# Patient Record
Sex: FEMALE | Race: WHITE | NOT HISPANIC OR LATINO | Employment: FULL TIME | URBAN - METROPOLITAN AREA
[De-identification: names, ages, dates, MRNs, and addresses within clinical notes are randomized per-mention and may not be internally consistent; named-entity substitution may affect disease eponyms.]

---

## 2023-05-14 ENCOUNTER — HOSPITAL ENCOUNTER (OUTPATIENT)
Facility: CLINIC | Age: 33
Discharge: HOME | End: 2023-05-14
Attending: OBSTETRICS & GYNECOLOGY | Admitting: OBSTETRICS & GYNECOLOGY
Payer: COMMERCIAL

## 2023-05-14 ENCOUNTER — NURSE TRIAGE (OUTPATIENT)
Dept: NURSING | Facility: CLINIC | Age: 33
End: 2023-05-14

## 2023-05-14 ENCOUNTER — HOSPITAL ENCOUNTER (OUTPATIENT)
Facility: CLINIC | Age: 33
End: 2023-05-14
Admitting: OBSTETRICS & GYNECOLOGY
Payer: COMMERCIAL

## 2023-05-14 VITALS — SYSTOLIC BLOOD PRESSURE: 120 MMHG | TEMPERATURE: 98.3 F | DIASTOLIC BLOOD PRESSURE: 78 MMHG

## 2023-05-14 PROBLEM — Z36.89 ENCOUNTER FOR TRIAGE IN PREGNANT PATIENT: Status: ACTIVE | Noted: 2023-05-14

## 2023-05-14 LAB
CRYSTALS AMN MICRO: NORMAL
RUPTURE OF FETAL MEMBRANES BY ROM PLUS: NEGATIVE

## 2023-05-14 PROCEDURE — 84112 EVAL AMNIOTIC FLUID PROTEIN: CPT | Performed by: OBSTETRICS & GYNECOLOGY

## 2023-05-14 PROCEDURE — 59025 FETAL NON-STRESS TEST: CPT | Mod: 26 | Performed by: OBSTETRICS & GYNECOLOGY

## 2023-05-14 PROCEDURE — G0463 HOSPITAL OUTPT CLINIC VISIT: HCPCS | Mod: 25

## 2023-05-14 PROCEDURE — 59025 FETAL NON-STRESS TEST: CPT

## 2023-05-14 PROCEDURE — 99203 OFFICE O/P NEW LOW 30 MIN: CPT | Mod: 25 | Performed by: OBSTETRICS & GYNECOLOGY

## 2023-05-14 RX ORDER — ASPIRIN 81 MG/1
81 TABLET, CHEWABLE ORAL DAILY
COMMUNITY

## 2023-05-14 RX ORDER — CALCIUM CARBONATE 500 MG/1
2 TABLET, CHEWABLE ORAL 3 TIMES DAILY
COMMUNITY

## 2023-05-14 RX ORDER — ASPIRIN 325 MG
2 TABLET ORAL DAILY
COMMUNITY

## 2023-05-14 RX ORDER — FAMOTIDINE 20 MG/1
20 TABLET, FILM COATED ORAL 2 TIMES DAILY PRN
COMMUNITY

## 2023-05-14 RX ORDER — LIDOCAINE 40 MG/G
CREAM TOPICAL
Status: DISCONTINUED | OUTPATIENT
Start: 2023-05-14 | End: 2023-05-14 | Stop reason: HOSPADM

## 2023-05-14 ASSESSMENT — ACTIVITIES OF DAILY LIVING (ADL)
ADLS_ACUITY_SCORE: 35
ADLS_ACUITY_SCORE: 31

## 2023-05-14 NOTE — PROGRESS NOTES
Pt presents to L&D for evaluation of SROM 5-13-23 @ 1030.  She states it was clear and mucousy.  She has been wearing a pantyliner since then with the same type of discharge. No pops or gushes of fluid.  Pt. Did have intercourse  Yesterday. Denies sandra vaginal bleeding or abd pain. She states she feels abd tightening which is not painful. Denies Pre-E symptoms.

## 2023-05-14 NOTE — TELEPHONE ENCOUNTER
"OB Triage Call    Is patient's OB/Midwife with the formerly [facility]? Non- [facility] OB provider     [facility] in [place].      Reason for call: Yesterday morning pt was having sex with spouse and  before pt orgasmed she had a few instances of significant watery discharge  (but had not orgasmed herself when this happened). States this is a lot more than normal and her  even asked if her water had broke.   Pt states over the past day, she's continued to have more leakage than normal - both watery and mucousy - had membranes swept 3 days ago.       Assessment: A couple instances with low back pain - and a few times of a \"really bad period cramps\" -   Had membranes swept this past Thursday. States baby is moving as per normal - States at least 5x/hour.  States her uterus is getting hard at times as well.      * DUE DATE:  5/18/2023    As [place] is on divery -   Plan: [facility]  Writer called and spoke with Charge Nurse Ashlee who advised it was ok for pt to come in.     Patient plans to deliver at [place]    Patient's primary OB Provider is Dr Layne Wallace.    Per protocol recommendations Patient to be evaluated in L&D.  Patient plans to deliver at Delivering [facility]: [facility] ([phone]).  Labor and Deliver notified of patient's pending arrival.  Report given to Usha.    Patient encouraged to consult primary OB provider office (not affiliated with [facility]) with additional questions or concerns and if planning to present to hospital for evaluation.       Is patient's delivering hospital on divert? Yes- If patient's hospital of choice is on divert, explain to patient their current hospital of choice is not accepting patients. Review other [facility] locations with patient. Patient's second hospital of choice Delivering Hospital: [facility] ([phone]). " " L&D notified of patient's pending arrival. Report given to Ashlee.  Route encounter to primary OB provider as FYI.       Unknown    Estimated Date of Delivery: 5/18/2023    No obstetric history on file.    OB History   No obstetric history on file.       No results found for: JAIMEE Antonio RN 05/14/23 7:27 AM  [facility] Nurse Advisor    Reason for Disposition    Leakage of fluid from vagina    Additional Information    Negative: [1] SEVERE abdominal pain (e.g., excruciating) AND [2] constant    Negative: Severe bleeding (e.g., continuous red blood from vagina, or large blood clots)    Negative: Umbilical cord hanging out of the vagina (shiny, white, curled appearance, \"like telephone cord\")    Negative: Uncontrollable urge to push (i.e., feels like baby is coming out now)    Negative: Can see baby    Negative: Sounds like a life-threatening emergency to the triager    Negative: < 20 weeks pregnant    Negative: Vaginal bleeding    Protocols used: PREGNANCY - RUPTURE OF MEMBRANES [id number]        "

## 2023-05-14 NOTE — PROGRESS NOTES
[facility]  OB Triage Visit      Jyothi Canchola MRN# [id number]   Age: 32 year old YOB: 1990     CC:  Leaking fluid    HPI:  Jyothi Canchola is a 32 year old  at 39w3d who presents with leaking fluid. She had intercourse yesterday and noted increased discharge for the rest of the day and this morning since then. Has had intermittent contractions, but denies contractions currently. Denies vaginal bleeding. Normal fetal movement.     PE:  Vit:   Patient Vitals for the past 4 hrs:   BP Temp Temp src   23 0900 120/78 98.3  F (36.8  C) Oral      Gen: Well-appearing, NAD, comfortable  CV: Well perfused, regular rate  Pulm: Breathing comfortably  Abd: Soft, gravid, non-tender  Ext: trace LE edema b/l  Cx: /-3    FHT: Baseline 125, mod variability, accelerations present, no decelerations, NST is reactive   Clutier: quiet      Assessment/Plan:  Jyothi Canchola is a 32 year old  39w3d who presents for rule out rupture of membranes.    Rule out ROM  - SSE without any pooling, or LOF with valsalva. ROM+ negative and ferning negative. No concern for ROM at this time. Cervix /-3, unchanged from exam a few days ago in clinic. No concern for labor. Stable for discharge.    The patient was discussed with Dr. Mayer who is in agreement with the treatment plan.    Cheyenne Hansen MD  Obstetrics & Gynecology, PGY-3  2023 10:50 AM       Physician Attestation   I, Miranda Mayer, saw and examined this patient separately from the resident and agree with the resident's findings and plan of care as documented in the note.      I personally reviewed vital signs, labs and fetal heart tones and toco.    Key findings: patient here at 39w3d for R/O SROM. Fetal status is reassuring with a reactive NST.   She is not deb and membranes are intact. Patient ok for discharge home.  Will follow-up with primary ob at [place].      Miranda Mayer MD  Date of Service (when I saw the  patient): 05/14/23

## 2023-05-14 NOTE — DISCHARGE INSTRUCTIONS
Discharge Instruction for Undelivered Patients      You were seen for: Membrane Assessment  We Consulted: Dr. Mayer and Dr. Hansen  You had (Test or Medicine):Fern and ROM+ test.      Diet:   Drink 8 to 12 glasses of liquids (milk, juice, water) every day.  You may eat meals and snacks.     Activity:  Count fetal kicks everyday (see handout)  Call your doctor or nurse midwife if your baby is moving less than usual.     Call your provider if you notice:  Swelling in your face or increased swelling in your hands or legs.  Headaches that are not relieved by Tylenol (acetaminophen).  Changes in your vision (blurring: seeing spots or stars.)  Nausea (sick to your stomach) and vomiting (throwing up).   Weight gain of 5 pounds or more per week.  Heartburn that doesn't go away.  Signs of bladder infection: pain when you urinate (use the toilet), need to go more often and more urgently.  The bag of liz (rupture of membranes) breaks, or you notice leaking in your underwear.  Bright red blood in your underwear.  Abdominal (lower belly) or stomach pain.  For first baby: Contractions (tightening) less than 5 minutes apart for one hour or more.  Increase or change in vaginal discharge (note the color and amount)  Other: drink 3 jugs of water daily    Follow-up:  As scheduled in the clinic

## 2023-05-14 NOTE — PROGRESS NOTES
Patient and spouse discharge to home undelivered.  Discharge instructions done with the patient and spouse.  They state no questions or concerns and Verbalize understanding.

## 2023-06-04 ENCOUNTER — HEALTH MAINTENANCE LETTER (OUTPATIENT)
Age: 33
End: 2023-06-04

## 2024-07-28 ENCOUNTER — HEALTH MAINTENANCE LETTER (OUTPATIENT)
Age: 34
End: 2024-07-28

## 2025-08-10 ENCOUNTER — HEALTH MAINTENANCE LETTER (OUTPATIENT)
Age: 35
End: 2025-08-10